# Patient Record
Sex: MALE | Race: WHITE
[De-identification: names, ages, dates, MRNs, and addresses within clinical notes are randomized per-mention and may not be internally consistent; named-entity substitution may affect disease eponyms.]

---

## 2017-05-01 ENCOUNTER — HOSPITAL ENCOUNTER (OUTPATIENT)
Dept: HOSPITAL 47 - OR | Age: 72
Discharge: HOME | End: 2017-05-01
Attending: SURGERY
Payer: MEDICARE

## 2017-05-01 VITALS — HEART RATE: 54 BPM | DIASTOLIC BLOOD PRESSURE: 77 MMHG | SYSTOLIC BLOOD PRESSURE: 140 MMHG

## 2017-05-01 VITALS — TEMPERATURE: 97.3 F | RESPIRATION RATE: 18 BRPM

## 2017-05-01 VITALS — BODY MASS INDEX: 30.9 KG/M2

## 2017-05-01 DIAGNOSIS — L98.8: ICD-10-CM

## 2017-05-01 DIAGNOSIS — M19.90: ICD-10-CM

## 2017-05-01 DIAGNOSIS — M79.7: ICD-10-CM

## 2017-05-01 DIAGNOSIS — G89.29: ICD-10-CM

## 2017-05-01 DIAGNOSIS — F17.200: ICD-10-CM

## 2017-05-01 DIAGNOSIS — M54.5: ICD-10-CM

## 2017-05-01 DIAGNOSIS — Z79.891: ICD-10-CM

## 2017-05-01 DIAGNOSIS — L90.5: Primary | ICD-10-CM

## 2017-05-01 PROCEDURE — 88304 TISSUE EXAM BY PATHOLOGIST: CPT

## 2017-05-01 PROCEDURE — 11404 EXC TR-EXT B9+MARG 3.1-4 CM: CPT

## 2017-05-01 NOTE — P.GSHP
History of Present Illness


H&P Date: 05/01/17


Chief Complaint: Right lower back skin lesion





Cyst 71-year-old male who has had issues with a chronically inflamed right back 

sebaceous cyst.  Patient presents today for excision of skin lesion.





Past Medical History


Past Medical History: Fibromyalgia, Osteoarthritis (OA)


Additional Past Medical History / Comment(s): CHRONIC BACK PAIN


History of Any Multi-Drug Resistant Organisms: None Reported


Past Surgical History: Cholecystectomy, Orthopedic Surgery, Tonsillectomy


Additional Past Surgical History / Comment(s): RIGHT KNEE


Past Psychological History: No Psychological Hx Reported


Smoking Status: Current every day smoker


Past Alcohol Use History: None Reported


Additional Past Alcohol Use History / Comment(s): STARTED SMOKING AT AGE 17 

SMOKES 1PPD


Past Drug Use History: None Reported





- Past Family History


  ** Mother


Family Medical History: Congestive Heart Failure (CHF)





  ** Father


Family Medical History: Cancer


Additional Family Medical History / Comment(s): LEUKEMIA





Medications and Allergies


 Home Medications











 Medication  Instructions  Recorded  Confirmed  Type


 


HYDROcodone/APAP 5-325MG [Norco 1 tab PO Q6HR PRN 04/28/17 05/01/17 History





5-325]    


 


Multivitamin [Men's Multi-Vitamin] 1 each PO DAILY 04/28/17 05/01/17 History


 


Omega-3 Fatty Acids/Fish Oil [Fish 1 each PO DAILY 04/28/17 05/01/17 History





Oil 1,000 mg Softgel]    











 Allergies











Allergy/AdvReac Type Severity Reaction Status Date / Time


 


No Known Allergies Allergy   Verified 05/01/17 07:14














Surgical - Exam


 Vital Signs











Temp Pulse Resp BP Pulse Ox


 


 97.3 F L  64   18   144/93   95 


 


 05/01/17 07:15  05/01/17 07:15  05/01/17 07:15  05/01/17 07:15  05/01/17 07:15














- General


well developed, no distress





- Eyes


PERRL





- ENT


normal pinna





- Neck


no masses





- Respiratory


normal expansion





- Cardiovascular


Rhythm: regular





- Abdomen


Abdomen: soft, non tender





- Integumentary





Right lower back skin lesion with evidence of chronic inflammation.





Assessment and Plan


Plan: 





Right lower back skin lesion.  We'll perform excisional biopsy.

## 2019-10-06 ENCOUNTER — HOSPITAL ENCOUNTER (EMERGENCY)
Dept: HOSPITAL 47 - EC | Age: 74
LOS: 1 days | Discharge: HOME | End: 2019-10-07
Payer: MEDICARE

## 2019-10-06 VITALS — RESPIRATION RATE: 18 BRPM

## 2019-10-06 DIAGNOSIS — M13.88: Primary | ICD-10-CM

## 2019-10-06 DIAGNOSIS — M79.601: ICD-10-CM

## 2019-10-06 DIAGNOSIS — F17.200: ICD-10-CM

## 2019-10-06 LAB
ALBUMIN SERPL-MCNC: 4.2 G/DL (ref 3.5–5)
ALP SERPL-CCNC: 53 U/L (ref 38–126)
ALT SERPL-CCNC: 24 U/L (ref 21–72)
ANION GAP SERPL CALC-SCNC: 9 MMOL/L
AST SERPL-CCNC: 37 U/L (ref 17–59)
BASOPHILS # BLD AUTO: 0.1 K/UL (ref 0–0.2)
BASOPHILS NFR BLD AUTO: 1 %
BUN SERPL-SCNC: 22 MG/DL (ref 9–20)
CALCIUM SPEC-MCNC: 9.6 MG/DL (ref 8.4–10.2)
CHLORIDE SERPL-SCNC: 103 MMOL/L (ref 98–107)
CO2 SERPL-SCNC: 26 MMOL/L (ref 22–30)
EOSINOPHIL # BLD AUTO: 0.1 K/UL (ref 0–0.7)
EOSINOPHIL NFR BLD AUTO: 1 %
ERYTHROCYTE [DISTWIDTH] IN BLOOD BY AUTOMATED COUNT: 4.59 M/UL (ref 4.3–5.9)
ERYTHROCYTE [DISTWIDTH] IN BLOOD: 12.8 % (ref 11.5–15.5)
GLUCOSE SERPL-MCNC: 105 MG/DL (ref 74–99)
HCT VFR BLD AUTO: 45.6 % (ref 39–53)
HGB BLD-MCNC: 14.4 GM/DL (ref 13–17.5)
LYMPHOCYTES # SPEC AUTO: 1.1 K/UL (ref 1–4.8)
LYMPHOCYTES NFR SPEC AUTO: 11 %
MCH RBC QN AUTO: 31.5 PG (ref 25–35)
MCHC RBC AUTO-ENTMCNC: 31.7 G/DL (ref 31–37)
MCV RBC AUTO: 99.5 FL (ref 80–100)
MONOCYTES # BLD AUTO: 0.6 K/UL (ref 0–1)
MONOCYTES NFR BLD AUTO: 6 %
NEUTROPHILS # BLD AUTO: 8.1 K/UL (ref 1.3–7.7)
NEUTROPHILS NFR BLD AUTO: 80 %
PLATELET # BLD AUTO: 264 K/UL (ref 150–450)
POTASSIUM SERPL-SCNC: 4.4 MMOL/L (ref 3.5–5.1)
PROT SERPL-MCNC: 7.1 G/DL (ref 6.3–8.2)
SODIUM SERPL-SCNC: 138 MMOL/L (ref 137–145)
WBC # BLD AUTO: 10.1 K/UL (ref 3.8–10.6)

## 2019-10-06 PROCEDURE — 87040 BLOOD CULTURE FOR BACTERIA: CPT

## 2019-10-06 PROCEDURE — 85025 COMPLETE CBC W/AUTO DIFF WBC: CPT

## 2019-10-06 PROCEDURE — 85652 RBC SED RATE AUTOMATED: CPT

## 2019-10-06 PROCEDURE — 73090 X-RAY EXAM OF FOREARM: CPT

## 2019-10-06 PROCEDURE — 73110 X-RAY EXAM OF WRIST: CPT

## 2019-10-06 PROCEDURE — 36415 COLL VENOUS BLD VENIPUNCTURE: CPT

## 2019-10-06 PROCEDURE — 96375 TX/PRO/DX INJ NEW DRUG ADDON: CPT

## 2019-10-06 PROCEDURE — 96374 THER/PROPH/DIAG INJ IV PUSH: CPT

## 2019-10-06 PROCEDURE — 80053 COMPREHEN METABOLIC PANEL: CPT

## 2019-10-06 PROCEDURE — 99283 EMERGENCY DEPT VISIT LOW MDM: CPT

## 2019-10-06 PROCEDURE — 86140 C-REACTIVE PROTEIN: CPT

## 2019-10-06 PROCEDURE — 96361 HYDRATE IV INFUSION ADD-ON: CPT

## 2019-10-06 NOTE — XR
EXAMINATION TYPE: XR forearm RT

 

DATE OF EXAM: 10/6/2019

 

COMPARISON: NONE

 

HISTORY: Pain

 

TECHNIQUE: 2 views

 

FINDINGS: Radius and ulna appear intact. There is some calcification of the triangular cartilage. The
re is spurring on the olecranon process of the ulna. There is spurring on the coronoid process of the
 ulna.

 

IMPRESSION: Degenerative spurring. Chondrocalcinosis. No fracture seen.

## 2019-10-06 NOTE — XR
EXAMINATION TYPE: XR wrist complete RT

 

DATE OF EXAM: 10/6/2019

 

COMPARISON: NONE

 

HISTORY: Pain

 

TECHNIQUE: 4 views

 

FINDINGS: There is some narrowing of the radiocarpal joint space. There is calcification of the trian
gular cartilage. Carpal bones are intact. I see no fracture. There is spurring at the first carpometa
carpal joint.

 

IMPRESSION: Chondrocalcinosis. Arthritic changes. No fracture seen.

## 2019-10-06 NOTE — ED
General Adult HPI





- General


Source: patient


Mode of arrival: ambulatory


Limitations: no limitations





<Odilia Baires - Last Filed: 10/07/19 01:02>





<Alex Engel - Last Filed: 10/11/19 07:49>





- General


Chief complaint: Extremity Injury, Upper


Stated complaint: Rt arm swelling


Time Seen by Provider: 10/06/19 21:09





- History of Present Illness


Initial comments: 


74-year-old male patient presents to the emergency department today for 

evaluation of right arm pain.  Patient states the pain has been hurting for the 

last week.  States it did improve after initial onset and then worsened again 

today.  Patient states he is having pain mostly around the wrist area but does 

extend up into the elbow.  States his skin hurts to touch, hurts to move his 

wrist or elbow.  He denies any injury.  Denies any recent fall.  Denies any 

history of injury to the arm.  Denies any swelling or history of DVT.  Does take

Norco for back pain, states he has been taking his without relief.  Denies any 

known fever or chills. Patient denies any recent rash, shortness breath, chest 

pain, abdominal pain, nausea, vomiting, diarrhea, constipation, back pain, 

numbness, tingling, dizziness, weakness, hematuria, dysuria, urinary urgency, 

urinary frequency, headache, visual changes, or any other complaints. 

(Odilia Baires)





- Related Data


                                Home Medications











 Medication  Instructions  Recorded  Confirmed


 


HYDROcodone/APAP 5-325MG [Norco 1 tab PO Q6HR PRN 04/28/17 05/01/17





5-325]   


 


Multivitamin [Men's Multi-Vitamin] 1 each PO DAILY 04/28/17 05/01/17


 


Omega-3 Fatty Acids/Fish Oil [Fish 1 each PO DAILY 04/28/17 05/01/17





Oil 1,000 mg Softgel]   








                                  Previous Rx's











 Medication  Instructions  Recorded


 


Ibuprofen [Motrin] 600 mg PO Q8HR PRN #15 tab 10/07/19


 


predniSONE 50 mg PO DAILY #5 tablet 10/07/19











                                    Allergies











Allergy/AdvReac Type Severity Reaction Status Date / Time


 


No Known Allergies Allergy   Verified 05/01/17 07:14














Review of Systems


ROS Other: All systems not noted in ROS Statement are negative.





<Odilia Baires - Last Filed: 10/07/19 01:02>


ROS Other: All systems not noted in ROS Statement are negative.





<Alex Engel - Last Filed: 10/11/19 07:49>


ROS Statement: 


Those systems with pertinent positive or pertinent negative responses have been 

documented in the HPI.








Past Medical History


Past Medical History: Fibromyalgia, Osteoarthritis (OA)


Additional Past Medical History / Comment(s): CHRONIC BACK PAIN


History of Any Multi-Drug Resistant Organisms: None Reported


Past Surgical History: Cholecystectomy, Orthopedic Surgery, Tonsillectomy


Additional Past Surgical History / Comment(s): RIGHT KNEE


Past Psychological History: No Psychological Hx Reported


Smoking Status: Current every day smoker


Past Alcohol Use History: None Reported


Past Drug Use History: None Reported





- Past Family History


  ** Mother


Family Medical History: Congestive Heart Failure (CHF)





  ** Father


Family Medical History: Cancer


Additional Family Medical History / Comment(s): LEUKEMIA





<Odilia Baires - Last Filed: 10/07/19 01:02>





General Exam


Limitations: no limitations


General appearance: alert, in no apparent distress, other (This is a well-devel

oped, well-nourished elderly male patient in mild distress related to pain.  

Vital signs upon presentation are temperature 99.9F oral, pulse 89, 

respirations 20, blood pressure 153/90, pulse ox 97% on room air.)


Eye exam: Present: normal appearance, PERRL, EOMI.  Absent: scleral icterus, 

conjunctival injection, periorbital swelling


ENT exam: Present: normal exam, normal oropharynx, mucous membranes moist


Respiratory exam: Present: normal lung sounds bilaterally.  Absent: respiratory 

distress, wheezes, rales, rhonchi, stridor


Cardiovascular Exam: Present: regular rate, normal rhythm, normal heart sounds. 

 Absent: systolic murmur, diastolic murmur, rubs, gallop, clicks


GI/Abdominal exam: Present: soft, normal bowel sounds.  Absent: distended, 

tenderness, guarding, rebound, rigid


Extremities exam: Present: normal inspection, full ROM, tenderness (generalized,

 exquisite), normal capillary refill, other (Into the arm is pink, warm, dry.  

Cap refills less than 3 seconds.  Radial pulses 2+ and equal bilaterally.  No 

swelling.).  Absent: pedal edema, joint swelling, calf tenderness


Neurological exam: Present: alert, oriented X3, CN II-XII intact


Psychiatric exam: Present: normal affect, normal mood


Skin exam: Present: warm, dry, intact, normal color.  Absent: rash





<Odilia Baires - Last Filed: 10/07/19 01:02>





Course





                                   Vital Signs











  10/06/19 10/06/19 10/07/19





  21:05 23:20 01:54


 


Temperature 99.9 F H 100 F H 99.8 F H


 


Pulse Rate 89 80 84


 


Respiratory 20 18 18





Rate   


 


Blood Pressure 153/90 142/87 159/95


 


O2 Sat by Pulse 97 98 98





Oximetry   














Medical Decision Making





- Lab Data


Result diagrams: 


                                 10/06/19 22:01





                                 10/06/19 22:01





- Radiology Data


Radiology results: report reviewed, image reviewed





<Odilia Baires - Last Filed: 10/07/19 01:02>





- Lab Data


Result diagrams: 


                                 10/06/19 22:01





                                 10/06/19 22:01





<Alex Engel - Last Filed: 10/11/19 07:49>





- Medical Decision Making


74-year-old male patient presents to the emergency department today for 

evaluation of right arm pain.  Patient did not have injury to the arm.  X-rays 

were obtained and were unremarkable.  Labs reviewed and did reveal elevation and

 inflammatory markers ESR and CRP.  Patient was given IV fluids, Dilaudid, and 

anti-inflammatories here in the emergency department.  Patient symptoms did 

improve.  He did have low-grade temperature, normal white blood cell count.  My 

attending Dr. Engel was in to see the patient. Symptoms and findings are 

consistent with inflammatory joint pain. We will do a burst of steroids and a 

short course of nsaids for pain relief. Patient is instructed to follow-up with 

his primary care physician for recheck tomorrow.  Return parameters were 

discussed in detail.  He is to maintain low threshold for return.  He verbalizes

 understanding and agrees with this plan.


 (Odilia Baires)


I saw this patient in conjunction with the physician assistant.  I performed 

independent history and physical exam.  Agree with case management.


Patient is offered admission for further evaluation as well as treatment, but at

 this point states he is feeling better and would like to go home to have follow

 up as outpatient. (Alex Engel)





- Lab Data





                                   Lab Results











  10/06/19 10/06/19 Range/Units





  22:01 22:01 


 


WBC   10.1  (3.8-10.6)  k/uL


 


RBC   4.59  (4.30-5.90)  m/uL


 


Hgb   14.4  (13.0-17.5)  gm/dL


 


Hct   45.6  (39.0-53.0)  %


 


MCV   99.5  (80.0-100.0)  fL


 


MCH   31.5  (25.0-35.0)  pg


 


MCHC   31.7  (31.0-37.0)  g/dL


 


RDW   12.8  (11.5-15.5)  %


 


Plt Count   264  (150-450)  k/uL


 


Neutrophils %   80  %


 


Lymphocytes %   11  %


 


Monocytes %   6  %


 


Eosinophils %   1  %


 


Basophils %   1  %


 


Neutrophils #   8.1 H  (1.3-7.7)  k/uL


 


Lymphocytes #   1.1  (1.0-4.8)  k/uL


 


Monocytes #   0.6  (0-1.0)  k/uL


 


Eosinophils #   0.1  (0-0.7)  k/uL


 


Basophils #   0.1  (0-0.2)  k/uL


 


ESR   35 H  (0-15)  mm/hr


 


Sodium  138   (137-145)  mmol/L


 


Potassium  4.4   (3.5-5.1)  mmol/L


 


Chloride  103   ()  mmol/L


 


Carbon Dioxide  26   (22-30)  mmol/L


 


Anion Gap  9   mmol/L


 


BUN  22 H   (9-20)  mg/dL


 


Creatinine  1.43 H   (0.66-1.25)  mg/dL


 


Est GFR (CKD-EPI)AfAm  56   (>60 ml/min/1.73 sqM)  


 


Est GFR (CKD-EPI)NonAf  49   (>60 ml/min/1.73 sqM)  


 


Glucose  105 H   (74-99)  mg/dL


 


Calcium  9.6   (8.4-10.2)  mg/dL


 


Total Bilirubin  0.6   (0.2-1.3)  mg/dL


 


AST  37   (17-59)  U/L


 


ALT  24   (21-72)  U/L


 


Alkaline Phosphatase  53   ()  U/L


 


C-Reactive Protein  65.5 H   (<10.0)  mg/L


 


Total Protein  7.1   (6.3-8.2)  g/dL


 


Albumin  4.2   (3.5-5.0)  g/dL














- Radiology Data


X-ray to the right forearm is obtained.  Report was reviewed in its entirety.  

Impression by Dr. Nicholson shows degenerative spurring.  Chondrocalcinosis.  No

 fracture seen.





X-ray of the right wrist is obtained.  Report was reviewed in its entirety.  

Impression by Dr. Nicholson shows chondrocalcinosis.  Arthritic changes.  No 

fracture seen. (Odilia Baires)





Disposition


Is patient prescribed a controlled substance at d/c from ED?: No


Time of Disposition: 00:55





<Odilia Baires - Last Filed: 10/07/19 01:02>





<Alex Engel - Last Filed: 10/11/19 07:49>


Clinical Impression: 


 Right arm pain, Inflammatory arthritis





Disposition: HOME SELF-CARE


Condition: Good


Instructions (If sedation given, give patient instructions):  Arthralgia (ED)


Additional Instructions: 


Take medications as directed.  Follow up with your primary care physician for 

recheck tomorrow.  Have renal function rechecked given use of anti-inflammatory 

medications.  Return to the emergency department immediately for any new, 

worsening, or concerning symptoms.


Prescriptions: 


Ibuprofen [Motrin] 600 mg PO Q8HR PRN #15 tab


 PRN Reason: Pain


predniSONE 50 mg PO DAILY #5 tablet


Referrals: 


[facility] [Primary Care Provider] - 1-2 days

## 2019-10-07 VITALS — TEMPERATURE: 99.8 F | SYSTOLIC BLOOD PRESSURE: 159 MMHG | HEART RATE: 84 BPM | DIASTOLIC BLOOD PRESSURE: 95 MMHG
